# Patient Record
Sex: FEMALE | NOT HISPANIC OR LATINO | Employment: FULL TIME | URBAN - NONMETROPOLITAN AREA
[De-identification: names, ages, dates, MRNs, and addresses within clinical notes are randomized per-mention and may not be internally consistent; named-entity substitution may affect disease eponyms.]

---

## 2020-01-17 ENCOUNTER — OFFICE VISIT (OUTPATIENT)
Dept: ORTHOPEDIC SURGERY | Facility: CLINIC | Age: 25
End: 2020-01-17

## 2020-01-17 ENCOUNTER — TELEPHONE (OUTPATIENT)
Dept: ORTHOPEDIC SURGERY | Facility: CLINIC | Age: 25
End: 2020-01-17

## 2020-01-17 VITALS — HEIGHT: 69 IN | TEMPERATURE: 97.5 F | BODY MASS INDEX: 22.78 KG/M2 | WEIGHT: 153.8 LBS

## 2020-01-17 DIAGNOSIS — M79.645 PAIN OF LEFT THUMB: Primary | ICD-10-CM

## 2020-01-17 DIAGNOSIS — R20.2 PARESTHESIAS: ICD-10-CM

## 2020-01-17 PROCEDURE — 73120 X-RAY EXAM OF HAND: CPT | Performed by: PHYSICIAN ASSISTANT

## 2020-01-17 PROCEDURE — 99204 OFFICE O/P NEW MOD 45 MIN: CPT | Performed by: PHYSICIAN ASSISTANT

## 2020-01-17 RX ORDER — IBUPROFEN 800 MG/1
800 TABLET ORAL EVERY 6 HOURS PRN
COMMUNITY

## 2020-01-17 NOTE — PROGRESS NOTES
"{AS New Follow Up Note Header:89984}    Patient: Theo Rich  ?  YOB: 1995    MRN: [id number]  ?  Chief Complaint   Patient presents with   • Left Hand - Pain, Establish Care      ?  HPI:     Pain Location: {MAKK body part:49780}  Radiation: {asradiationpain:11193}  Quality: {asquality:22553}  Intensity/Severity: {asseveritypain:36744}  Duration: {Time:06441}  Onset quality: {asonsetquality:68718}  Timing: {astimin}  Aggravating Factors: {AS Aggravating Factors Ortho:25807}  Alleviating Factors: {AS Alleviating Factors:22817}  Previous Episodes: {aspreviousepisodes:38248}  Associated Symptoms: {asassociatedsymptoms:23355}  ADLs Affected: {ADL ASMEH:44703}  Previous Treatment: ***    This patient is {new/est pt:4859134104}.  This problem {AS IS/NOT:45504} new to this examiner.      Allergies: Allergies no known allergies    Medications:   Home Medications:  Current Outpatient Medications on File Prior to Visit   Medication Sig   • ibuprofen (ADVIL,MOTRIN) 800 MG tablet Take 800 mg by mouth Every 6 (Six) Hours As Needed for Mild Pain .     No current facility-administered medications on file prior to visit.      Current Medications:  Scheduled Meds:  PRN Meds:.    I have reviewed the patient's medical history in detail and updated the computerized patient record.  Review and summarization of old records include:    No past medical history on file.  No past surgical history on file.  Social History     Occupational History   • Not on file   Tobacco Use   • Smoking status: Not on file   Substance and Sexual Activity   • Alcohol use: Not on file   • Drug use: Not on file   • Sexual activity: Not on file      Social History     Social History Narrative   • Not on file     No family history on file.      Review of Systems       Wt Readings from Last 3 Encounters:   20 69.8 kg (153 lb 12.8 oz)     Ht Readings from Last 3 Encounters:   20 174 cm (68.5\")     Body mass index is 23.05 " kg/m².  Facility age limit for growth percentiles is 20 years.  Vitals:    01/17/20 0835   Temp: 97.5 °F (36.4 °C)         Physical Exam  ***      Ortho Exam:   {Musculoskeletal Exams:07825}    {Post Op Total Joint Arthroplasty Exam:53085}    {Post Op Detailed Exam:15564}      Diagnostics:  {Diagnostics options AS:90227}      Assessment:  There are no diagnoses linked to this encounter.      Procedures  ?    Plan    · Compression/brace  · Rest, ice, compression, and elevation (RICE) therapy  · Stretching and strengthening exercises  · {OTC pain:09289}  · Follow up in *** {WEEKS/MONTHS:38755}    Date of encounter: 01/17/2020   Ariana Osoiro MA

## 2020-01-23 ENCOUNTER — TELEPHONE (OUTPATIENT)
Dept: ORTHOPEDIC SURGERY | Facility: CLINIC | Age: 25
End: 2020-01-23

## 2020-01-23 NOTE — TELEPHONE ENCOUNTER
WISAM WITH [facility] [employer] LEFT A VOICEMAIL @ 12:27 PM REQUESTING PATIENT'S OFFICE NOTE FROM 1/17/20 BE FAXED TO HER @ [phone].  WISAM'S PHONE# [phone] EXT [phone]

## 2020-01-24 ENCOUNTER — TELEPHONE (OUTPATIENT)
Dept: ORTHOPEDIC SURGERY | Facility: CLINIC | Age: 25
End: 2020-01-24

## 2020-01-24 NOTE — TELEPHONE ENCOUNTER
CALLED AND SPOKE WITH THE PATIENT TO EXPLAIN THAT BOTH HER  AND THE [facility] WERE CALLING REQUESTING RECORDS FROM OUR OFFICE IN REGARDS TO HER LAST VISIT. I EXPLAINED THAT I HAD TO OBTAIN PERMISSION FROM HER TO DO THIS BC THIS WAS NOT FILED AS WORKERS COMPENSATION AT HER FIRST APPOINTMENT. IT WAS FILED UNDER HER REGULAR INSURANCE. GIL GAVE PERMISSION FOR OUR OFFICE TO FAX ANY INFORMATION REQUESTED TO THE [facility] AND HER [facility] DEPARTMENT/[facility]AVEN

## 2020-01-27 PROBLEM — R20.2 PARESTHESIAS: Status: ACTIVE | Noted: 2020-01-27

## 2020-01-27 PROBLEM — M79.645 PAIN OF LEFT THUMB: Status: ACTIVE | Noted: 2020-01-27

## 2020-01-27 NOTE — PROGRESS NOTES
"NEW VISIT    Patient: Theo Rich  ?  YOB: 1995    MRN: [id number]  ?  Chief Complaint   Patient presents with   • Left Hand - Pain, Establish Care      ?  HPI:   24-year-old female who presents with complaint of left thumb and wrist pain.  She reports a tingling sensation in her left wrist and thumb area.  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  She reports her symptoms began approximately 6 weeks ago, on 12/5/2019, while working.  She reports her symptoms have improved slightly and now she is experiencing a \" soreness.\"  Pain Location: left thumb/wrist  Radiation: Into radial aspect of wrist  Quality: aching  Intensity/Severity: mild   Duration: 6 weeks  Onset quality: gradual   Timing: intermittent  Aggravating Factors: Sleeping, increased use of left wrist and hand, lifting items with left hand  Alleviating Factors: rest  Previous Episodes: none mentioned  Associated Symptoms: pain, swelling, numbness/tingling  ADLs Affected: grooming/hygiene/toileting/personal care, work related activities, recreational activities/sports  [facility]    This patient is a new patient.  This problem is new to this examiner.      Allergies: Allergies no known allergies    Medications:   Home Medications:  Current Outpatient Medications on File Prior to Visit   Medication Sig   • ibuprofen (ADVIL,MOTRIN) 800 MG tablet Take 800 mg by mouth Every 6 (Six) Hours As Needed for Mild Pain .     No current facility-administered medications on file prior to visit.      Current Medications:  Scheduled Meds:  PRN Meds:.    I have reviewed the patient's medical history in detail and updated the computerized patient record.  Review and summarization of old records include:    No past medical history on file.  No past surgical history on file.  Social History     Occupational History   • Not on " "file   Tobacco Use   • Smoking status: Not on file   Substance and Sexual Activity   • Alcohol use: Not on file   • Drug use: Not on file   • Sexual activity: Not on file      Social History     Social History Narrative   • Not on file     No family history on file.      Review of Systems  Constitutional: Negative.    HENT: Negative.    Eyes: Negative.    Respiratory: Negative.    Cardiovascular: Negative.    Endocrine: Negative.    Musculoskeletal: Positive for arthralgias, decreased ROM.  Skin: Negative.    Allergic/Immunologic: Negative.    Neurological: Positive for numbness.   Hematological: Negative.    Psychiatric/Behavioral: Negative.           Wt Readings from Last 3 Encounters:   01/17/20 69.8 kg (153 lb 12.8 oz)     Ht Readings from Last 3 Encounters:   01/17/20 174 cm (68.5\")     Body mass index is 23.05 kg/m².  Facility age limit for growth percentiles is 20 years.  Vitals:    01/17/20 0835   Temp: 97.5 °F (36.4 °C)         Physical Exam  Constitutional: Patient is oriented to person, place, and time. Appears well-developed and well-nourished.   HENT:   Head: Normocephalic and atraumatic.   Eyes: Conjunctivae and EOM are normal. Pupils are equal, round, and reactive to light.   Cardiovascular: Normal rate and intact distal pulses.   Pulmonary/Chest: Effort normal and breath sounds normal.   Musculoskeletal:   See detailed exam below   Neurological: Alert and oriented to person, place, and time. Coordination normal. Slightly delayed sensory function/two-point discrimination.  Skin: Skin is warm and dry. Capillary refill takes less than 2 seconds. No rash noted. No erythema.   Psychiatric: Patient has a normal mood and affect. Her behavior is normal. Judgment and thought content normal.   Nursing note and vitals reviewed.      Ortho Exam:   Left wrist:   The patient is left hand dominant. The Skin is mildly swollen volarly over the proximal crease of the wrist. Radial pulse is palpable. Capillary refill " is 2 seconds with a brisk return. Positive Tinel's sign at the wrist. Positive Phalen's sign at the wrist .  strength is slightly impaired.  There is no muscle wasting or atrophy specifically involving the thenar muscle group.  Sensation to light touch and pinprick are diminished over the thumb, index and middle fingers.  Flexor and extensor tendon functions are well preserved. Moving 2 point discrimination is prolonged to 12mm over the median nerve distribution. No evidence of RSD.  There is no clinical evidence of renal disease, thyroid disease or any generalized neurological condition that could be causing nerve dysfunction.No evidence of a positive Finklestein,      Diagnostics:  X-Ray Report:  Left X-Ray  Indication: Evaluation of left 5th finger and /wrist pain  AP, Lateral views  Findings: No acute bony abnormality is noted.  Bony lesion: no  Soft tissues: within normal limits  Joint spaces: within normal limits  Hardware appropriately positioned: yes  Prior studies available for comparison: no   X-RAY was ordered and reviewed by Carlos Bobby PA-C        Assessment:  Theo was seen today for pain and establish care.    Diagnoses and all orders for this visit:    Pain of left thumb  -     XR Hand 2 View Left    Paresthesias  -     EMG & Nerve Conduction Test; Future          Plan    · There was a detailed discussion between the patient and myself regarding her current symptoms, physical exam findings and imaging results.  The following treatment options were discussed:  · steroid injection discussed   · Physical Therapy discussed   · Use of brace discussed and recommended   · Rest, ice, compression, and elevation (RICE) therapy  · Stretching and strengthening exercises  · Alternate OTC Ibuprofen and Tylenol as needed/if clinically indicated  · Discussed further testing with EMG/NCS to evaluate for possible carpal tunnel. Also discussed the possibility of differential diagnose of de Quervain's  tendonitis.  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  My recommendation would be to refrain from use of the left hand until the area calms down and we can pinpoint the exact issue.    Date of encounter: 01/17/2020   Carlos Bobby PA-C    Electronically signed by Carlos Bobby PA-C, 01/27/20, 7:27 AM.

## 2020-01-28 NOTE — TELEPHONE ENCOUNTER
OFFICE NOTE AND EMG REQUEST FAXED TO ALANA AND SHE CALLED AND EXPLAINED THAT IF IT WAS DECIDED THAT THIS [facility] WAS A WORK COMP CASE SHE WOULD LET ME KNOW IF EMG WAS APPROVED. IF IT ISN'T APPROVED AS WORK COMP I WILL GET EMG SCHEDULED USING REGULAR INSURANCE/RJ

## 2020-01-31 ENCOUNTER — TELEPHONE (OUTPATIENT)
Dept: ORTHOPEDIC SURGERY | Facility: CLINIC | Age: 25
End: 2020-01-31

## 2020-02-14 ENCOUNTER — OFFICE VISIT (OUTPATIENT)
Dept: ORTHOPEDIC SURGERY | Facility: CLINIC | Age: 25
End: 2020-02-14

## 2020-02-14 VITALS — TEMPERATURE: 97.2 F | WEIGHT: 152.4 LBS | HEIGHT: 69 IN | BODY MASS INDEX: 22.57 KG/M2

## 2020-02-14 DIAGNOSIS — M65.4 DE QUERVAIN'S DISEASE (RADIAL STYLOID TENOSYNOVITIS): Primary | ICD-10-CM

## 2020-02-14 PROCEDURE — 99213 OFFICE O/P EST LOW 20 MIN: CPT | Performed by: PHYSICIAN ASSISTANT

## 2020-02-24 PROBLEM — M65.4 DE QUERVAIN'S DISEASE (RADIAL STYLOID TENOSYNOVITIS): Status: ACTIVE | Noted: 2020-02-24

## 2020-02-24 NOTE — PROGRESS NOTES
FOLLOW UP VISIT    Patient: Theo Rich  ?  YOB: 1995    MRN: [id number]  ?  Chief Complaint   Patient presents with   • Left Hand - Follow-up, Numbness, Pain   • Results     EMG RESULTS FROM [facility]      ?  HPI:   24-year-old female returns for follow-up on left thumb and wrist pain.  She does continue to report intermittent tingling sensation in her left wrist and thumb area.  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  She is nearly complete with her temporary position and states she will soon be able to let the hand and wrist rest.  Her pain level at times is a 5 out of 10.  She does continue to use ibuprofen as needed.  She returns today to review EMG results of left upper extremity.      Pain Location: left thumb/wrist  Radiation: Into radial aspect of wrist  Quality: aching  Intensity/Severity: mild   Duration: 6 weeks  Onset quality: gradual   Timing: intermittent  Aggravating Factors: Sleeping, increased use of left wrist and hand, lifting items with left hand  Alleviating Factors: rest  Previous Episodes: none mentioned  Associated Symptoms: pain, swelling, numbness/tingling  ADLs Affected: grooming/hygiene/toileting/personal care, work related activities, recreational activities/sports  [facility]    This patient is an established patient.  This problem is not new to this examiner.      Allergies: No Known Allergies    Medications:   Home Medications:  Current Outpatient Medications on File Prior to Visit   Medication Sig   • ibuprofen (ADVIL,MOTRIN) 800 MG tablet Take 800 mg by mouth Every 6 (Six) Hours As Needed for Mild Pain .     No current facility-administered medications on file prior to visit.      Current Medications:  Scheduled Meds:  PRN Meds:.    I have reviewed the patient's medical history in detail and updated the computerized patient record.   "Review and summarization of old records include:    No past medical history on file.  No past surgical history on file.  Social History     Occupational History   • Not on file   Tobacco Use   • Smoking status: Not on file   Substance and Sexual Activity   • Alcohol use: Not on file   • Drug use: Not on file   • Sexual activity: Not on file      Social History     Social History Narrative   • Not on file     No family history on file.      Review of Systems  Constitutional: Negative.    HENT: Negative.    Eyes: Negative.    Respiratory: Negative.    Cardiovascular: Negative.    Endocrine: Negative.    Musculoskeletal: Positive for arthralgias, decreased ROM.  Skin: Negative.    Allergic/Immunologic: Negative.    Neurological: Positive for numbness.   Hematological: Negative.    Psychiatric/Behavioral: Negative.           Wt Readings from Last 3 Encounters:   02/14/20 69.1 kg (152 lb 6.4 oz)   01/17/20 69.8 kg (153 lb 12.8 oz)     Ht Readings from Last 3 Encounters:   02/14/20 175.3 cm (69\")   01/17/20 174 cm (68.5\")     Body mass index is 22.51 kg/m².  Facility age limit for growth percentiles is 20 years.  Vitals:    02/14/20 1001   Temp: 97.2 °F (36.2 °C)         Physical Exam  Constitutional: Patient is oriented to person, place, and time. Appears well-developed and well-nourished.   HENT:   Head: Normocephalic and atraumatic.   Eyes: Conjunctivae and EOM are normal. Pupils are equal, round, and reactive to light.   Cardiovascular: Normal rate and intact distal pulses.   Pulmonary/Chest: Effort normal and breath sounds normal.   Musculoskeletal:   See detailed exam below   Neurological: Alert and oriented to person, place, and time. Coordination normal. Slightly delayed sensory function/two-point discrimination.  Skin: Skin is warm and dry. Capillary refill takes less than 2 seconds. No rash noted. No erythema.   Psychiatric: Patient has a normal mood and affect. Her behavior is normal. Judgment and thought " content normal.   Nursing note and vitals reviewed.      Ortho Exam:   Left wrist:   The patient is left hand dominant.  Swelling over the proximal crease of the wrist has improved. Radial pulse is palpable. Capillary refill is 2 seconds with a brisk return.  Today there is a negative Tinel's sign and Phalen's sign at the wrist.  strength is slightly impaired although seems to be slightly improved from previous visit.  There is no muscle wasting or atrophy specifically involving the thenar muscle group.  Sensation to light touch and pinprick are diminished over the thumb, index and middle fingers.  Flexor and extensor tendon functions are well preserved. No evidence of RSD.  There is no clinical evidence of renal disease, thyroid disease or any generalized neurological condition that could be causing nerve dysfunction. Today there is a positive Finkelstein's test.      Diagnostics:  Reviewed EMG and nerve conduction testing results, performed at [facility] on 2/11/2020, summary of impression below:  · No evidence to suggest cervical radiculopathy, brachial plexopathy, isolated mononeuropathy, or large fiber peripheral neuropathy involving left upper extremity.    · Nerve conduction study and EMG is normal.      Assessment:  Theo was seen today for results, follow-up, numbness and pain.    Diagnoses and all orders for this visit:    De Quervain's disease (radial styloid tenosynovitis)          Plan    · Discussion regarding EMG and nerve conduction study findings, patient's current symptoms and diagnoses.  · steroid injection discussed   · Physical Therapy discussed   · Use of brace discussed and recommended   · Rest, ice, compression, and elevation (RICE) therapy  · Alternate OTC Ibuprofen and Tylenol as needed/if clinically indicated  · Patient is to follow-up as needed    Date of encounter: 2/14/2020   Carlos Bobby PA-C    Electronically signed by Carlos Bobby PA-C, 02/24/20, 8:23 AM.